# Patient Record
Sex: MALE | Race: WHITE | Employment: FULL TIME | URBAN - METROPOLITAN AREA
[De-identification: names, ages, dates, MRNs, and addresses within clinical notes are randomized per-mention and may not be internally consistent; named-entity substitution may affect disease eponyms.]

---

## 2020-06-08 ENCOUNTER — OFFICE VISIT (OUTPATIENT)
Dept: ORTHOPEDIC SURGERY | Age: 45
End: 2020-06-08
Payer: COMMERCIAL

## 2020-06-08 VITALS — HEIGHT: 70 IN | WEIGHT: 235 LBS | BODY MASS INDEX: 33.64 KG/M2

## 2020-06-08 PROCEDURE — 99203 OFFICE O/P NEW LOW 30 MIN: CPT | Performed by: ORTHOPAEDIC SURGERY

## 2020-06-08 NOTE — PROGRESS NOTES
belly press testing and bearhug testing. He does have positive impingement testing    Skin: There are no rashes, ulcerations or lesions. Gait: Normal      Additional Comments:       Additional Examinations:         Left Upper Extremity: Examination of the left upper extremity does not show any tenderness, deformity or injury. Range of motion is unremarkable. There is no gross instability. There are no rashes, ulcerations or lesions. Strength and tone are normal.    Radiology:     X-rays obtained and reviewed in office:  Views 4 views of the right knee demonstrates no obvious fracture dislocation or other osseous abnormalities      Assessment : Right shoulder concern for proximal bicep tendon tear, concern for possible rotator cuff tear    Impression:  Encounter Diagnosis   Name Primary?  Right shoulder pain, unspecified chronicity Yes       Office Procedures:  Orders Placed This Encounter   Procedures    XR SHOULDER RIGHT (MIN 2 VIEWS)     Standing Status:   Future     Number of Occurrences:   1     Standing Expiration Date:   6/8/2021       Treatment Plan: I discussed the diagnosis and treatment options with him today. Recommend at this time MRI of the shoulder to rule out proximal bicep tendon or rotator cuff tear. He is agreeable with this plan.   We will see him back once MRI is completed to go over those results

## 2020-07-08 ENCOUNTER — OFFICE VISIT (OUTPATIENT)
Dept: ORTHOPEDIC SURGERY | Age: 45
End: 2020-07-08
Payer: COMMERCIAL

## 2020-07-08 PROCEDURE — 99213 OFFICE O/P EST LOW 20 MIN: CPT | Performed by: ORTHOPAEDIC SURGERY

## 2020-07-08 NOTE — PROGRESS NOTES
There are no rashes, ulcerations or lesions. Strength and tone are normal.    Radiology:     rative   Site: [facility] #: [id number] #: [id number] Procedure: MR Right Shoulder joint w/o Contrast ; Reason for Exam: Dx, bicep tendon tear   This document is confidential medical information.  Unauthorized disclosure or use of this information is prohibited by law. If you are not the intended recipient of this document, please advise us by calling immediately [phone].       [employer] \[facility]\""[address]           Patient Name: Ac Leggett   Case ID: [id number]   Patient : [id number]   Referring Physician: Kiesha Mercado MD   Exam Date: 2020   Exam Description: MR Right Shoulder joint w/o Contrast            HISTORY:  Felt tear lifting weights.       TECHNICAL FACTORS:  Long- and short-axis fat- and water-weighted images were performed.       COMPARISON:  None.       FINDINGS:  Moderate to severe AC joint osteoarthropathy.  Extensive microfracturing throughout    the distal clavicle and acromion.       Mild tendinosis supraspinatus, infraspinatus and subscapularis insertions.  Mild    peritendinobursitis.       Biceps long head tendon is intact.  No tear.       SLAP type 2C tear.  The biceps anchor is frayed.       Small effusion.  No loose body.  No capsular injury.  No soft tissue mass.       CONCLUSION:   1. Marked AC arthropathy. Active microfracturing throughout the distal clavicle and acromion    with pseudocysts/osteolysis related to overuse and microinstability. No AC separation. 2. SLAP type 2C tear. Biceps anchor is frayed. 3. Intact biceps long head tendon. 4. Mild cuff tendinosis and peritendinobursitis. No macrotear. 5. Please see above. Assessment : Right shoulder type IIc SLAP tear, AC joint arthropathy    Impression:  Encounter Diagnoses   Name Primary?     Superior glenoid labrum lesion of right shoulder, subsequent encounter Yes    AC joint arthropathy        Office Procedures:  No orders of the defined types were placed in this encounter. Treatment Plan: I discussed the diagnosis and treatment options with him today. We discussed the option of physical therapy versus shoulder arthroscopy with subacromial decompression as well as distal clavicle excision and biceps tenodesis. He would like to consider these 2 options. I am going to place a order for therapy today so he can at least get that started. He will contact us if he does wish to proceed with surgical treatment.